# Patient Record
Sex: FEMALE | URBAN - METROPOLITAN AREA
[De-identification: names, ages, dates, MRNs, and addresses within clinical notes are randomized per-mention and may not be internally consistent; named-entity substitution may affect disease eponyms.]

---

## 2023-08-24 ENCOUNTER — TELEPHONE (OUTPATIENT)
Age: 23
End: 2023-08-24

## 2023-08-24 NOTE — TELEPHONE ENCOUNTER
Patient called and stated that she injured her finger on her right hand a while ago and it's still bent/curved and never straightened back up and she's very [email or web address] Please advise the soonest she can be seen at the [address] location or HS location. Patient can be reached at [phone].